# Patient Record
Sex: MALE | Race: WHITE | NOT HISPANIC OR LATINO | Employment: UNEMPLOYED | URBAN - METROPOLITAN AREA
[De-identification: names, ages, dates, MRNs, and addresses within clinical notes are randomized per-mention and may not be internally consistent; named-entity substitution may affect disease eponyms.]

---

## 2019-04-15 ENCOUNTER — HOSPITAL ENCOUNTER (EMERGENCY)
Facility: MEDICAL CENTER | Age: 29
End: 2019-04-15
Attending: EMERGENCY MEDICINE

## 2019-04-15 VITALS
WEIGHT: 154.98 LBS | OXYGEN SATURATION: 95 % | SYSTOLIC BLOOD PRESSURE: 116 MMHG | TEMPERATURE: 97 F | BODY MASS INDEX: 22.19 KG/M2 | HEART RATE: 85 BPM | RESPIRATION RATE: 16 BRPM | DIASTOLIC BLOOD PRESSURE: 83 MMHG | HEIGHT: 70 IN

## 2019-04-15 DIAGNOSIS — M62.838 MUSCLE SPASM: ICD-10-CM

## 2019-04-15 DIAGNOSIS — M54.50 LUMBAR BACK PAIN: ICD-10-CM

## 2019-04-15 PROCEDURE — 99283 EMERGENCY DEPT VISIT LOW MDM: CPT

## 2019-04-15 PROCEDURE — 96372 THER/PROPH/DIAG INJ SC/IM: CPT

## 2019-04-15 PROCEDURE — 700111 HCHG RX REV CODE 636 W/ 250 OVERRIDE (IP): Performed by: EMERGENCY MEDICINE

## 2019-04-15 RX ORDER — IBUPROFEN 600 MG/1
600 TABLET ORAL EVERY 6 HOURS PRN
Qty: 20 TAB | Refills: 0 | Status: SHIPPED | OUTPATIENT
Start: 2019-04-15 | End: 2019-10-27

## 2019-04-15 RX ORDER — DIAZEPAM 5 MG/1
5 TABLET ORAL EVERY 6 HOURS PRN
Qty: 20 TAB | Refills: 0 | Status: SHIPPED | OUTPATIENT
Start: 2019-04-15 | End: 2019-04-20

## 2019-04-15 RX ORDER — KETOROLAC TROMETHAMINE 30 MG/ML
30 INJECTION, SOLUTION INTRAMUSCULAR; INTRAVENOUS ONCE
Status: COMPLETED | OUTPATIENT
Start: 2019-04-15 | End: 2019-04-15

## 2019-04-15 RX ADMIN — KETOROLAC TROMETHAMINE 30 MG: 30 INJECTION, SOLUTION INTRAMUSCULAR at 11:31

## 2019-04-15 ASSESSMENT — LIFESTYLE VARIABLES: DO YOU DRINK ALCOHOL: NO
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
## 2019-04-15 NOTE — ED NOTES
Patient given d/c instructions and prescriptions, verbalized understanding. AAOx4, VSS, d/c'd home.

## 2019-04-17 ENCOUNTER — HOSPITAL ENCOUNTER (EMERGENCY)
Facility: MEDICAL CENTER | Age: 29
End: 2019-04-17
Attending: EMERGENCY MEDICINE

## 2019-04-17 VITALS
TEMPERATURE: 98 F | SYSTOLIC BLOOD PRESSURE: 110 MMHG | WEIGHT: 155.42 LBS | DIASTOLIC BLOOD PRESSURE: 81 MMHG | RESPIRATION RATE: 16 BRPM | BODY MASS INDEX: 21.76 KG/M2 | HEIGHT: 71 IN | OXYGEN SATURATION: 94 % | HEART RATE: 68 BPM

## 2019-04-17 DIAGNOSIS — R07.9 CHEST PAIN, UNSPECIFIED TYPE: ICD-10-CM

## 2019-04-17 DIAGNOSIS — F41.9 ANXIETY: ICD-10-CM

## 2019-04-17 LAB — EKG IMPRESSION: NORMAL

## 2019-04-17 PROCEDURE — 93005 ELECTROCARDIOGRAM TRACING: CPT

## 2019-04-17 PROCEDURE — 700111 HCHG RX REV CODE 636 W/ 250 OVERRIDE (IP): Performed by: EMERGENCY MEDICINE

## 2019-04-17 PROCEDURE — 96372 THER/PROPH/DIAG INJ SC/IM: CPT

## 2019-04-17 PROCEDURE — 93005 ELECTROCARDIOGRAM TRACING: CPT | Performed by: EMERGENCY MEDICINE

## 2019-04-17 PROCEDURE — 99284 EMERGENCY DEPT VISIT MOD MDM: CPT

## 2019-04-17 RX ORDER — LORAZEPAM 2 MG/ML
2 INJECTION INTRAMUSCULAR ONCE
Status: COMPLETED | OUTPATIENT
Start: 2019-04-17 | End: 2019-04-17

## 2019-04-17 RX ADMIN — LORAZEPAM 2 MG: 2 INJECTION INTRAMUSCULAR; INTRAVENOUS at 23:16

## 2019-04-17 ASSESSMENT — PAIN DESCRIPTION - DESCRIPTORS: DESCRIPTORS: ACHING;PRESSURE

## 2019-04-18 NOTE — ED NOTES
Pt leaving AMA due to not wanting blood work or any type of treatment, pt reports that he would just like something for his anxiety. Pt discussed this with the ERP. Pt medicated per MD order, pt signed AMA form. Pt is A&Ox4, ambulates with a steady gait by self out to ed lobby with his wife.
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
## 2019-06-10 ENCOUNTER — HOSPITAL ENCOUNTER (EMERGENCY)
Facility: MEDICAL CENTER | Age: 29
End: 2019-06-10
Attending: EMERGENCY MEDICINE

## 2019-06-10 VITALS
HEART RATE: 94 BPM | SYSTOLIC BLOOD PRESSURE: 131 MMHG | BODY MASS INDEX: 21.87 KG/M2 | RESPIRATION RATE: 16 BRPM | DIASTOLIC BLOOD PRESSURE: 65 MMHG | TEMPERATURE: 98.5 F | OXYGEN SATURATION: 98 % | HEIGHT: 70 IN | WEIGHT: 152.78 LBS

## 2019-06-10 DIAGNOSIS — M54.32 BILATERAL SCIATICA: ICD-10-CM

## 2019-06-10 DIAGNOSIS — S39.012A STRAIN OF LUMBAR REGION, INITIAL ENCOUNTER: ICD-10-CM

## 2019-06-10 DIAGNOSIS — M54.31 BILATERAL SCIATICA: ICD-10-CM

## 2019-06-10 PROCEDURE — A9270 NON-COVERED ITEM OR SERVICE: HCPCS | Performed by: EMERGENCY MEDICINE

## 2019-06-10 PROCEDURE — 700111 HCHG RX REV CODE 636 W/ 250 OVERRIDE (IP): Performed by: EMERGENCY MEDICINE

## 2019-06-10 PROCEDURE — 700102 HCHG RX REV CODE 250 W/ 637 OVERRIDE(OP): Performed by: EMERGENCY MEDICINE

## 2019-06-10 PROCEDURE — 99284 EMERGENCY DEPT VISIT MOD MDM: CPT

## 2019-06-10 PROCEDURE — 96372 THER/PROPH/DIAG INJ SC/IM: CPT

## 2019-06-10 RX ORDER — NAPROXEN 500 MG/1
500 TABLET ORAL 2 TIMES DAILY WITH MEALS
Qty: 60 TAB | Refills: 0 | Status: SHIPPED | OUTPATIENT
Start: 2019-06-10 | End: 2019-10-27

## 2019-06-10 RX ORDER — CYCLOBENZAPRINE HCL 10 MG
10 TABLET ORAL ONCE
Status: COMPLETED | OUTPATIENT
Start: 2019-06-10 | End: 2019-06-10

## 2019-06-10 RX ORDER — KETOROLAC TROMETHAMINE 30 MG/ML
30 INJECTION, SOLUTION INTRAMUSCULAR; INTRAVENOUS ONCE
Status: COMPLETED | OUTPATIENT
Start: 2019-06-10 | End: 2019-06-10

## 2019-06-10 RX ORDER — METHYLPREDNISOLONE 4 MG/1
TABLET ORAL
Qty: 1 KIT | Refills: 0 | Status: SHIPPED | OUTPATIENT
Start: 2019-06-10 | End: 2019-10-27

## 2019-06-10 RX ORDER — CYCLOBENZAPRINE HCL 10 MG
10 TABLET ORAL 3 TIMES DAILY PRN
Qty: 30 TAB | Refills: 0 | Status: SHIPPED | OUTPATIENT
Start: 2019-06-10 | End: 2019-10-27

## 2019-06-10 RX ADMIN — CYCLOBENZAPRINE HYDROCHLORIDE 10 MG: 10 TABLET, FILM COATED ORAL at 20:40

## 2019-06-10 RX ADMIN — KETOROLAC TROMETHAMINE 30 MG: 30 INJECTION, SOLUTION INTRAMUSCULAR at 20:40

## 2019-06-11 NOTE — ED TRIAGE NOTES
Chief Complaint   Patient presents with   • Low Back Pain     radiating to BLE     Patient ambulatory to triage with friend; reports involved in treatment program, adamant about not receiveing narcotics for pain relief.      Explained wait time and triage process to pt. Pt placed back out in lobby, told to notify ED tech or triage RN of any changes, verbalized understanding.
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
## 2019-10-07 ENCOUNTER — HOSPITAL ENCOUNTER (EMERGENCY)
Facility: MEDICAL CENTER | Age: 29
End: 2019-10-07
Attending: EMERGENCY MEDICINE
Payer: MEDICAID

## 2019-10-07 VITALS
OXYGEN SATURATION: 98 % | SYSTOLIC BLOOD PRESSURE: 100 MMHG | DIASTOLIC BLOOD PRESSURE: 66 MMHG | BODY MASS INDEX: 20.77 KG/M2 | TEMPERATURE: 99.3 F | WEIGHT: 145.06 LBS | HEART RATE: 60 BPM | RESPIRATION RATE: 14 BRPM | HEIGHT: 70 IN

## 2019-10-07 DIAGNOSIS — M54.42 CHRONIC BILATERAL LOW BACK PAIN WITH LEFT-SIDED SCIATICA: ICD-10-CM

## 2019-10-07 DIAGNOSIS — G89.29 CHRONIC BILATERAL LOW BACK PAIN WITH LEFT-SIDED SCIATICA: ICD-10-CM

## 2019-10-07 PROCEDURE — 700102 HCHG RX REV CODE 250 W/ 637 OVERRIDE(OP): Performed by: EMERGENCY MEDICINE

## 2019-10-07 PROCEDURE — 700111 HCHG RX REV CODE 636 W/ 250 OVERRIDE (IP): Performed by: EMERGENCY MEDICINE

## 2019-10-07 PROCEDURE — A9270 NON-COVERED ITEM OR SERVICE: HCPCS | Performed by: EMERGENCY MEDICINE

## 2019-10-07 PROCEDURE — 99284 EMERGENCY DEPT VISIT MOD MDM: CPT

## 2019-10-07 PROCEDURE — 96372 THER/PROPH/DIAG INJ SC/IM: CPT

## 2019-10-07 RX ORDER — KETOROLAC TROMETHAMINE 30 MG/ML
60 INJECTION, SOLUTION INTRAMUSCULAR; INTRAVENOUS ONCE
Status: COMPLETED | OUTPATIENT
Start: 2019-10-07 | End: 2019-10-07

## 2019-10-07 RX ORDER — DIAZEPAM 5 MG/1
5 TABLET ORAL ONCE
Status: COMPLETED | OUTPATIENT
Start: 2019-10-07 | End: 2019-10-07

## 2019-10-07 RX ADMIN — DIAZEPAM 5 MG: 5 TABLET ORAL at 18:41

## 2019-10-07 RX ADMIN — KETOROLAC TROMETHAMINE 60 MG: 30 INJECTION, SOLUTION INTRAMUSCULAR at 18:41
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
## 2019-10-08 NOTE — DISCHARGE INSTRUCTIONS
Please use the contact information we have provided to schedule a visit with Dr. herrera's regarding your ongoing back issues.  Use the other clinic information to set up a primary care visit.
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
## 2019-10-27 ENCOUNTER — HOSPITAL ENCOUNTER (EMERGENCY)
Facility: MEDICAL CENTER | Age: 29
End: 2019-10-27
Attending: EMERGENCY MEDICINE
Payer: MEDICAID

## 2019-10-27 VITALS
SYSTOLIC BLOOD PRESSURE: 121 MMHG | RESPIRATION RATE: 18 BRPM | DIASTOLIC BLOOD PRESSURE: 69 MMHG | HEIGHT: 70 IN | BODY MASS INDEX: 21.15 KG/M2 | WEIGHT: 147.71 LBS | TEMPERATURE: 97.8 F | OXYGEN SATURATION: 95 % | HEART RATE: 72 BPM

## 2019-10-27 DIAGNOSIS — K02.9 DENTAL CARIES: ICD-10-CM

## 2019-10-27 DIAGNOSIS — F41.9 ANXIETY: ICD-10-CM

## 2019-10-27 DIAGNOSIS — H92.02 LEFT EAR PAIN: ICD-10-CM

## 2019-10-27 PROCEDURE — 96372 THER/PROPH/DIAG INJ SC/IM: CPT

## 2019-10-27 PROCEDURE — 99283 EMERGENCY DEPT VISIT LOW MDM: CPT

## 2019-10-27 PROCEDURE — 700111 HCHG RX REV CODE 636 W/ 250 OVERRIDE (IP): Performed by: EMERGENCY MEDICINE

## 2019-10-27 RX ORDER — AMOXICILLIN 875 MG/1
875 TABLET, COATED ORAL 2 TIMES DAILY
COMMUNITY
Start: 2019-10-14

## 2019-10-27 RX ORDER — AMOXICILLIN 500 MG/1
500 CAPSULE ORAL 3 TIMES DAILY
Qty: 21 CAP | Refills: 0 | Status: SHIPPED | OUTPATIENT
Start: 2019-10-27 | End: 2019-11-03

## 2019-10-27 RX ORDER — IBUPROFEN 200 MG
600 TABLET ORAL EVERY 6 HOURS PRN
COMMUNITY

## 2019-10-27 RX ORDER — LORAZEPAM 2 MG/ML
2 INJECTION INTRAMUSCULAR ONCE
Status: COMPLETED | OUTPATIENT
Start: 2019-10-27 | End: 2019-10-27

## 2019-10-27 RX ADMIN — LORAZEPAM 2 MG: 2 INJECTION INTRAMUSCULAR; INTRAVENOUS at 15:56

## 2019-10-27 NOTE — ED PROVIDER NOTES
ED Provider Note    CHIEF COMPLAINT  Chief Complaint   Patient presents with   • Anxiety   • Earache       HPI  Medardo Vergara is a 29 y.o. male with a history of anxiety, PTSD who presents with complaints of left ear pain for the past 2 weeks along with increased anxiety and stress.  The patient appears to be somewhat tremulous, and says that he has been out of his Klonopin for his anxiety for at least 2 weeks.  The patient says he has appointment to see his primary care provider tomorrow.  He notes that he has been having some pain to the area just behind his left ear for the past 2 weeks.  He has not noticed any ear drainage or hearing loss.  He denies any fever, chills, runny nose, sore throat, cough, congestion, nausea, or vomiting.  He does note he may have a bad tooth in his left lower jaw where he has a cracked second molar.  Has not noticed any swelling to the gums or cheek area.    REVIEW OF SYSTEMS  See HPI for further details. All other systems are negative.     PAST MEDICAL HISTORY  Past Medical History:   Diagnosis Date   • PTSD (post-traumatic stress disorder)        FAMILY HISTORY  History reviewed. No pertinent family history.    SOCIAL HISTORY  Social History     Socioeconomic History   • Marital status:      Spouse name: Not on file   • Number of children: Not on file   • Years of education: Not on file   • Highest education level: Not on file   Occupational History   • Not on file   Social Needs   • Financial resource strain: Not on file   • Food insecurity:     Worry: Not on file     Inability: Not on file   • Transportation needs:     Medical: Not on file     Non-medical: Not on file   Tobacco Use   • Smoking status: Current Every Day Smoker     Packs/day: 0.50     Years: 5.00     Pack years: 2.50     Types: Cigarettes   • Smokeless tobacco: Never Used   Substance and Sexual Activity   • Alcohol use: No     Comment: rarely   • Drug use: No   • Sexual activity: Not on file   Lifestyle   •  "Physical activity:     Days per week: Not on file     Minutes per session: Not on file   • Stress: Not on file   Relationships   • Social connections:     Talks on phone: Not on file     Gets together: Not on file     Attends Mandaen service: Not on file     Active member of club or organization: Not on file     Attends meetings of clubs or organizations: Not on file     Relationship status: Not on file   • Intimate partner violence:     Fear of current or ex partner: Not on file     Emotionally abused: Not on file     Physically abused: Not on file     Forced sexual activity: Not on file   Other Topics Concern   • Not on file   Social History Narrative   • Not on file       SURGICAL HISTORY  History reviewed. No pertinent surgical history.    CURRENT MEDICATIONS  Home Medications    **Home medications have not yet been reviewed for this encounter**         ALLERGIES  Allergies   Allergen Reactions   • Ciprofloxacin      Other reaction(s): Dyspnea       PHYSICAL EXAM  VITAL SIGNS: Blood Pressure 121/69   Pulse 72   Temperature 36.6 °C (97.8 °F) (Temporal)   Respiration 18   Height 1.778 m (5' 10\")   Weight 67 kg (147 lb 11.3 oz)   Oxygen Saturation 95%   Body Mass Index 21.19 kg/m²   Constitutional: Awake, alert, very anxious appearing, nontoxic.  HENT: Atraumatic. Bilateral external ears normal, mucous membranes moist, throat nonerythematous without exudates, nose is normal.  Appears to be several carious appearing teeth.  To the left lower jaw the second molar appears to be cracked.  There is no swelling or tenderness along the buccal mucosa or gumline.  There is no trismus, patient is able to open close mouth without difficulty.  There is no swelling to the soft palate or uvula.  Eyes: PERRL, EOMI, conjunctiva moist, noninjected.  Neck: Nontender, Normal range of motion, No nuchal rigidity, No stridor.   Lymphatic: No lymphadenopathy noted.   Cardiovascular: Regular rate and rhythm, no murmurs, rubs, " gallops.  Thorax & Lungs:  Good breath sounds bilaterally, no wheezes, rales, or retractions.  No chest tenderness.  Abdomen: Bowel sounds normal, Soft, nontender, nondistended, no rebound, guarding, masses.  Back: No CVA or spinal tenderness.  Extremities: Intact distal pulses, No edema, No tenderness.   Skin: Warm, Dry, No rashes.   Musculoskeletal: No joint swelling or tenderness.  Neurologic: Alert & oriented x 3, sensory and motor function normal. No focal deficits.   Psychiatric: Very anxious appearing, speech is slightly pressured, repetitive counseling of the lower extremities.  Patient denies any suicidal homicidal ideation.        COURSE & MEDICAL DECISION MAKING  Pertinent Labs & Imaging studies reviewed. (See chart for details)  The patient presents with the above complaints.  He has been seen several times for anxiety, and did receive a prescription for Valium approximately 6 months ago.  Given that this is a chronic ongoing problem, patient was told he would not need to get any further prescriptions from his primary care physician.  He was offered a dose of Valium, but says that he appears much more stressed and anxious than usual.  He was given Ativan 2 mg IM.  The patient complains of left ear pain, but the tympanic membrane looks fine.  His tenderness appears to be in the inferior periauricular area when I do not really detect any skin swelling, erythema, induration.  He he may have some radiation of dental pain as he does have a carious appearing second molar.  The patient will placed on a one-week course of amoxicillin for his possible dental caries.  He is to follow-up with a dentist as soon as possible.  He is to return to the ER for any worsening symptoms, fever, increased pain, redness, or swelling, increased anxiety or depression, or any other problems.    FINAL IMPRESSION  1.  Dental caries  2.  Left otalgia  3.  Anxiety disorder         Electronically signed by: Floyd Burton, 10/27/2019  3:50 PM

## 2019-10-27 NOTE — ED TRIAGE NOTES
"Presents with a hx of PTSD with recurring episodes of anxiety.  He also C/O left earache persisting for weeks.  Chief Complaint   Patient presents with   • Anxiety   • Earache     /69   Pulse 72   Temp 36.6 °C (97.8 °F) (Temporal)   Resp 18   Ht 1.778 m (5' 10\")   Wt 67 kg (147 lb 11.3 oz)   SpO2 95%   BMI 21.19 kg/m²     "

## 2019-10-27 NOTE — ED NOTES
Discharge instructions provided. Rx x1 given and pt educated on how and when to take medication, pt educated on s/s of increased infection, and educated to finish course of antibiotics.  Pt verbalized the understanding of discharge instructions to follow up with PCP and to return to ER if condition worsens.  Pt ambulated out of ER without difficulty. Pt called an uber home.

## 2019-10-27 NOTE — ED NOTES
L ear pain for the last 2 weeks, pt was prescribed amoxicillin 2 weeks ago for a lower L  dental pain in [place] (admitted for opioid withdraws), pt did not finish course, pt came in c/o L ear pain with difficulty hearing.

## 2019-12-02 ENCOUNTER — APPOINTMENT (OUTPATIENT)
Dept: RADIOLOGY | Facility: MEDICAL CENTER | Age: 29
End: 2019-12-02
Attending: EMERGENCY MEDICINE
Payer: MEDICAID

## 2019-12-02 ENCOUNTER — HOSPITAL ENCOUNTER (EMERGENCY)
Facility: MEDICAL CENTER | Age: 29
End: 2019-12-02
Attending: EMERGENCY MEDICINE
Payer: MEDICAID

## 2019-12-02 VITALS
OXYGEN SATURATION: 95 % | RESPIRATION RATE: 16 BRPM | HEIGHT: 70 IN | BODY MASS INDEX: 20.96 KG/M2 | WEIGHT: 146.39 LBS | HEART RATE: 93 BPM | DIASTOLIC BLOOD PRESSURE: 72 MMHG | TEMPERATURE: 99.9 F | SYSTOLIC BLOOD PRESSURE: 100 MMHG

## 2019-12-02 DIAGNOSIS — J10.1 INFLUENZA B: ICD-10-CM

## 2019-12-02 LAB
FLUAV RNA SPEC QL NAA+PROBE: NEGATIVE
FLUBV RNA SPEC QL NAA+PROBE: POSITIVE

## 2019-12-02 PROCEDURE — 700111 HCHG RX REV CODE 636 W/ 250 OVERRIDE (IP): Mod: JW | Performed by: EMERGENCY MEDICINE

## 2019-12-02 PROCEDURE — 96372 THER/PROPH/DIAG INJ SC/IM: CPT

## 2019-12-02 PROCEDURE — 71046 X-RAY EXAM CHEST 2 VIEWS: CPT

## 2019-12-02 PROCEDURE — 99284 EMERGENCY DEPT VISIT MOD MDM: CPT

## 2019-12-02 PROCEDURE — 87502 INFLUENZA DNA AMP PROBE: CPT

## 2019-12-02 RX ORDER — KETOROLAC TROMETHAMINE 30 MG/ML
15 INJECTION, SOLUTION INTRAMUSCULAR; INTRAVENOUS ONCE
Status: COMPLETED | OUTPATIENT
Start: 2019-12-02 | End: 2019-12-02

## 2019-12-02 RX ORDER — OSELTAMIVIR PHOSPHATE 75 MG/1
75 CAPSULE ORAL 2 TIMES DAILY
Qty: 10 CAP | Refills: 0 | Status: SHIPPED | OUTPATIENT
Start: 2019-12-02 | End: 2019-12-07

## 2019-12-02 RX ORDER — SODIUM CHLORIDE 9 MG/ML
1000 INJECTION, SOLUTION INTRAVENOUS ONCE
Status: DISCONTINUED | OUTPATIENT
Start: 2019-12-02 | End: 2019-12-02

## 2019-12-02 RX ADMIN — KETOROLAC TROMETHAMINE 15 MG: 30 INJECTION, SOLUTION INTRAMUSCULAR at 03:12

## 2019-12-02 ASSESSMENT — LIFESTYLE VARIABLES: DO YOU DRINK ALCOHOL: NO

## 2019-12-02 NOTE — ED TRIAGE NOTES
"Chief Complaint   Patient presents with   • Flu Like Symptoms   • Generalized Body Aches     Cough, congestion, body aches x 1-2 days     /60   Pulse 96   Temp 36.2 °C (97.2 °F) (Temporal)   Resp 18   Ht 1.778 m (5' 10\")   Wt 66.4 kg (146 lb 6.2 oz)   SpO2 95%   BMI 21.00 kg/m²     30 y/o male presents to ED with complaint of cough, congestion, headaches, and generalized malaise over past 1-2 days.  He describes subjective F/C but has not measured a fever.  Has not taken any OTC medication for these symptoms.     Educated on triage process. Placed back in lobby. Advised to notify triage RN with any changes. Apologized for wait times.     "
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
## 2022-04-18 ENCOUNTER — HOSPITAL ENCOUNTER (EMERGENCY)
Facility: MEDICAL CENTER | Age: 32
End: 2022-04-18
Attending: EMERGENCY MEDICINE
Payer: COMMERCIAL

## 2022-04-18 VITALS
BODY MASS INDEX: 19.44 KG/M2 | SYSTOLIC BLOOD PRESSURE: 114 MMHG | TEMPERATURE: 98.4 F | HEIGHT: 70 IN | HEART RATE: 97 BPM | OXYGEN SATURATION: 97 % | WEIGHT: 135.8 LBS | RESPIRATION RATE: 20 BRPM | DIASTOLIC BLOOD PRESSURE: 88 MMHG

## 2022-04-18 DIAGNOSIS — F13.20 BENZODIAZEPINE DEPENDENCE (HCC): ICD-10-CM

## 2022-04-18 DIAGNOSIS — F10.230 ALCOHOL DEPENDENCE WITH UNCOMPLICATED WITHDRAWAL (HCC): ICD-10-CM

## 2022-04-18 LAB — POC BREATHALIZER: 0 PERCENT (ref 0–0.01)

## 2022-04-18 PROCEDURE — A9270 NON-COVERED ITEM OR SERVICE: HCPCS | Performed by: EMERGENCY MEDICINE

## 2022-04-18 PROCEDURE — 99284 EMERGENCY DEPT VISIT MOD MDM: CPT

## 2022-04-18 PROCEDURE — 302970 POC BREATHALIZER: Performed by: EMERGENCY MEDICINE

## 2022-04-18 PROCEDURE — 700102 HCHG RX REV CODE 250 W/ 637 OVERRIDE(OP): Performed by: EMERGENCY MEDICINE

## 2022-04-18 PROCEDURE — 96372 THER/PROPH/DIAG INJ SC/IM: CPT

## 2022-04-18 PROCEDURE — 700111 HCHG RX REV CODE 636 W/ 250 OVERRIDE (IP): Performed by: EMERGENCY MEDICINE

## 2022-04-18 RX ORDER — ONDANSETRON 4 MG/1
4 TABLET, ORALLY DISINTEGRATING ORAL ONCE
Status: COMPLETED | OUTPATIENT
Start: 2022-04-18 | End: 2022-04-18

## 2022-04-18 RX ORDER — CLONAZEPAM 0.5 MG/1
0.5 TABLET ORAL ONCE
Status: COMPLETED | OUTPATIENT
Start: 2022-04-18 | End: 2022-04-18

## 2022-04-18 RX ORDER — PHENOBARBITAL SODIUM 130 MG/ML
200 INJECTION, SOLUTION INTRAMUSCULAR; INTRAVENOUS ONCE
Status: COMPLETED | OUTPATIENT
Start: 2022-04-18 | End: 2022-04-18

## 2022-04-18 RX ADMIN — ONDANSETRON 4 MG: 4 TABLET, ORALLY DISINTEGRATING ORAL at 20:22

## 2022-04-18 RX ADMIN — PHENOBARBITAL SODIUM 200 MG: 130 INJECTION INTRAMUSCULAR; INTRAVENOUS at 20:23

## 2022-04-18 RX ADMIN — CLONAZEPAM 0.5 MG: 0.5 TABLET ORAL at 21:11

## 2022-04-18 ASSESSMENT — LIFESTYLE VARIABLES
CONSUMPTION TOTAL: POSITIVE
HOW MANY TIMES IN THE PAST YEAR HAVE YOU HAD 5 OR MORE DRINKS IN A DAY: 300
TOTAL SCORE: 4
HAVE PEOPLE ANNOYED YOU BY CRITICIZING YOUR DRINKING: YES
EVER HAD A DRINK FIRST THING IN THE MORNING TO STEADY YOUR NERVES TO GET RID OF A HANGOVER: YES
TOTAL SCORE: 4
AVERAGE NUMBER OF DAYS PER WEEK YOU HAVE A DRINK CONTAINING ALCOHOL: 6
DO YOU DRINK ALCOHOL: YES
ON A TYPICAL DAY WHEN YOU DRINK ALCOHOL HOW MANY DRINKS DO YOU HAVE: 25
HAVE YOU EVER FELT YOU SHOULD CUT DOWN ON YOUR DRINKING: YES
DOES PATIENT WANT TO TALK TO SOMEONE ABOUT QUITTING: YES
EVER FELT BAD OR GUILTY ABOUT YOUR DRINKING: YES
DOES PATIENT WANT TO STOP DRINKING: YES
TOTAL SCORE: 4

## 2022-04-19 NOTE — ED NOTES
"Pt discharged home with ride. pt in possession of belongings. Pt provided discharge education and information pertaining to medications and follow up appointments. Pt received copy of discharge instructions and verbalized understanding. /88   Pulse 97   Temp 36.9 °C (98.4 °F) (Tympanic)   Resp 20   Ht 1.778 m (5' 10\")   Wt 61.6 kg (135 lb 12.9 oz)   SpO2 97%   BMI 19.49 kg/m²   "

## 2022-04-19 NOTE — DISCHARGE PLANNING
Alert Team:    Discussed additional alcohol detox options at [facility] since [facility] had no bed availability until tomorrow. Patient declined this option stating he has a friend that he can stay with tonight to f/u at [facility] tomorrow. No other needs vocalized at this time. Informed Dr. Salcido.

## 2022-04-19 NOTE — ED TRIAGE NOTES
Patient drinks about 1 pint a day of hard alcohol and has been taking clonazepam but stopped two days ago.  In the meantime he is struggling with anxiety.  Denies SI.  States he has a support system.  Tried [facility] but no bed is available.

## 2022-04-19 NOTE — ED PROVIDER NOTES
ED Provider Note    CHIEF COMPLAINT  Chief Complaint   Patient presents with   • Detox       HPI  Medardo Vergara is a 32 y.o. male who presents to the emergency department for withdrawal symptoms.  The patient states he quit taking fentanyl 18 days ago and has been on Suboxone ever since then and doing well with it so he decided he wanted to stop all of his other drugs including alcohol and clonazepam.  He states he last drank alcohol about 40 hours ago and clonazepam was about half a day or day before that.  He states he is feeling nauseated he is feeling shaky extremely anxious he has been unable to sleep he denies any hallucinations and he has not had seizures.  Lately he has been drinking a pint or more of alcohol a day he has never had seizure withdrawals before and this is the worst he is ever felt with withdrawal symptoms.  He went to [facility] today but states that they were full and due to his insurance he cannot get the first bed that is available but he is here for further help.    REVIEW OF SYSTEMS  Positives as above. Pertinent negatives include fever chills cough congestion chest pain abdominal pain flank pain vomiting hallucinations  All other review of systems are negative    PAST MEDICAL HISTORY   has a past medical history of PTSD (post-traumatic stress disorder).    SOCIAL HISTORY  Social History     Tobacco Use   • Smoking status: Current Every Day Smoker     Packs/day: 0.50     Years: 5.00     Pack years: 2.50     Types: Cigarettes   • Smokeless tobacco: Never Used   Substance and Sexual Activity   • Alcohol use: No     Comment: rarely   • Drug use: No   • Sexual activity: Not on file       SURGICAL HISTORY  patient denies any surgical history    CURRENT MEDICATIONS  Home Medications     Reviewed by Deonna Randhawa R.N. (Registered Nurse) on 04/18/22 at 1832  Med List Status: <None>   Medication Last Dose Status   amoxicillin (AMOXIL) 875 MG tablet  Active   ibuprofen (MOTRIN) 200  "MG Tab  Active                ALLERGIES  Allergies   Allergen Reactions   • Ciprofloxacin      Other reaction(s): Dyspnea       PHYSICAL EXAM  VITAL SIGNS: /94   Pulse 100   Temp 37.3 °C (99.1 °F) (Temporal)   Resp 20   Ht 1.778 m (5' 10\")   Wt 61.6 kg (135 lb 12.9 oz)   SpO2 94%   BMI 19.49 kg/m²    Pulse ox interpretation: I interpret this pulse ox as normal.  Constitutional: Alert in moderate distress  HENT: Normocephalic atraumatic, MMM mild tongue fasciculation  Eyes: PER, Conjunctiva normal, Non-icteric.   Cardiovascular: Regular rate and rhythm, no murmurs.   Thorax & Lungs: Normal breath sounds, No respiratory distress, No wheezing, No chest tenderness.   Abdomen: Bowel sounds normal, Soft, No tenderness, No pulsatile masses. No peritoneal signs.  Skin: Warm, Dry, No erythema, No rash.   Extremities/MSK: Fine tremor bilateral hands intact equal distal pulses, No edema, No tenderness, No cyanosis, no major deformities noted  Neurologic: Alert and oriented x3, No focal deficits noted.   Psychiatric: Agitated affect constantly pacing but speaking slowly and cooperative      DIFFERENTIAL DIAGNOSIS AND WORK UP PLAN    This is a 32 y.o. male who presents with benzo and alcohol withdrawal last use about 2 days ago, I request we do a breathalyzer just to ensure that he really has no alcohol on board and then he will be treated with some IM phenobarbital and Zofran and I will discuss case for behavioral health and hopefully we can get him to a facility today.  Otherwise his abdomen is soft nontender and beyond some fasciculations he is not responding to internal stimuli  He states his uncle brought him in today and can take him home until he gets into [facility]    DIAGNOSTIC STUDIES / PROCEDURES  LABS  Pertinent Lab Findings    Labs Reviewed   POC BREATHALIZER - Normal       COURSE & MEDICAL DECISION MAKING  Pertinent Labs & Imaging studies reviewed. (See chart for details)    8:51 PM  Behavioral health NP " review of discussed with the patient he just wishes to go home and follow-up with our  tomorrow.    I verified that the patient was wearing a mask and I was wearing appropriate PPE every time I entered the room. The patient's mask was on the patient at all times during my encounter except for a brief view of the oropharynx.    The patient will return for new or worsening symptoms and is stable at the time of discharge.    The patient is referred to a primary physician for blood pressure management, diabetic screening, and for all other preventative health concerns.    DISPOSITION:  Patient will be discharged home in stable condition.    FOLLOW UP:  [facility], Emergency Dept  [address] [facility] [address] [id number] [phone]    If symptoms worsen    [facility] [address] [place] [phone]          OUTPATIENT MEDICATIONS:  Discharge Medication List as of 4/18/2022  9:12 PM              FINAL IMPRESSION  1. Alcohol dependence with uncomplicated withdrawal (HCC)     2. Benzodiazepine dependence (HCC)             Electronically signed by: Julia Salcido M.D., 4/18/2022 8:03 PM    This dictation has been created using voice recognition software and/or scribes. The accuracy of the dictation is limited by the abilities of the software and the expertise of the scribes. I expect there may be some errors of grammar and possibly content. I made every attempt to manually correct the errors within my dictation. However, errors related to voice recognition software and/or scribes may still exist and should be interpreted within the appropriate context.

## 2022-04-19 NOTE — ED NOTES
"Ambulated to room with steady gait, pt appears anxious and reports hes not taking his anxiety medication.   Last ETOH drink was \"at least 4 days\"  "

## 2022-04-19 NOTE — ED NOTES
Pt reports he has bed at [facility] tomorrow at ten. Instructed pt to come back if he needed too.
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
## 2022-04-26 ENCOUNTER — APPOINTMENT (OUTPATIENT)
Dept: RADIOLOGY | Facility: MEDICAL CENTER | Age: 32
End: 2022-04-26
Attending: EMERGENCY MEDICINE
Payer: COMMERCIAL

## 2022-04-26 ENCOUNTER — HOSPITAL ENCOUNTER (EMERGENCY)
Facility: MEDICAL CENTER | Age: 32
End: 2022-04-26
Attending: EMERGENCY MEDICINE
Payer: COMMERCIAL

## 2022-04-26 VITALS
HEART RATE: 107 BPM | SYSTOLIC BLOOD PRESSURE: 110 MMHG | TEMPERATURE: 97.3 F | WEIGHT: 148 LBS | DIASTOLIC BLOOD PRESSURE: 71 MMHG | RESPIRATION RATE: 20 BRPM | OXYGEN SATURATION: 96 % | BODY MASS INDEX: 21.19 KG/M2 | HEIGHT: 70 IN

## 2022-04-26 DIAGNOSIS — R07.9 CHEST PAIN, UNSPECIFIED TYPE: ICD-10-CM

## 2022-04-26 DIAGNOSIS — F41.0 PANIC ATTACK: ICD-10-CM

## 2022-04-26 LAB
ALBUMIN SERPL BCP-MCNC: 4.9 G/DL (ref 3.2–4.9)
ALBUMIN/GLOB SERPL: 1.8 G/DL
ALP SERPL-CCNC: 67 U/L (ref 30–99)
ALT SERPL-CCNC: 24 U/L (ref 2–50)
AMPHET UR QL SCN: NEGATIVE
ANION GAP SERPL CALC-SCNC: 12 MMOL/L (ref 7–16)
AST SERPL-CCNC: 21 U/L (ref 12–45)
BARBITURATES UR QL SCN: POSITIVE
BASOPHILS # BLD AUTO: 0.9 % (ref 0–1.8)
BASOPHILS # BLD: 0.07 K/UL (ref 0–0.12)
BENZODIAZ UR QL SCN: NEGATIVE
BILIRUB SERPL-MCNC: 0.2 MG/DL (ref 0.1–1.5)
BUN SERPL-MCNC: 19 MG/DL (ref 8–22)
BZE UR QL SCN: NEGATIVE
CALCIUM SERPL-MCNC: 9.6 MG/DL (ref 8.5–10.5)
CANNABINOIDS UR QL SCN: NEGATIVE
CHLORIDE SERPL-SCNC: 103 MMOL/L (ref 96–112)
CO2 SERPL-SCNC: 25 MMOL/L (ref 20–33)
CREAT SERPL-MCNC: 0.83 MG/DL (ref 0.5–1.4)
EKG IMPRESSION: NORMAL
EOSINOPHIL # BLD AUTO: 0.05 K/UL (ref 0–0.51)
EOSINOPHIL NFR BLD: 0.7 % (ref 0–6.9)
ERYTHROCYTE [DISTWIDTH] IN BLOOD BY AUTOMATED COUNT: 39.2 FL (ref 35.9–50)
ETHANOL BLD-MCNC: <10.1 MG/DL (ref 0–10)
GFR SERPLBLD CREATININE-BSD FMLA CKD-EPI: 118 ML/MIN/1.73 M 2
GLOBULIN SER CALC-MCNC: 2.7 G/DL (ref 1.9–3.5)
GLUCOSE SERPL-MCNC: 100 MG/DL (ref 65–99)
HCT VFR BLD AUTO: 43.7 % (ref 42–52)
HGB BLD-MCNC: 15 G/DL (ref 14–18)
IMM GRANULOCYTES # BLD AUTO: 0.07 K/UL (ref 0–0.11)
IMM GRANULOCYTES NFR BLD AUTO: 0.9 % (ref 0–0.9)
LYMPHOCYTES # BLD AUTO: 2.16 K/UL (ref 1–4.8)
LYMPHOCYTES NFR BLD: 28.5 % (ref 22–41)
MCH RBC QN AUTO: 30.5 PG (ref 27–33)
MCHC RBC AUTO-ENTMCNC: 34.3 G/DL (ref 33.7–35.3)
MCV RBC AUTO: 88.8 FL (ref 81.4–97.8)
METHADONE UR QL SCN: NEGATIVE
MONOCYTES # BLD AUTO: 0.61 K/UL (ref 0–0.85)
MONOCYTES NFR BLD AUTO: 8 % (ref 0–13.4)
NEUTROPHILS # BLD AUTO: 4.62 K/UL (ref 1.82–7.42)
NEUTROPHILS NFR BLD: 61 % (ref 44–72)
NRBC # BLD AUTO: 0 K/UL
NRBC BLD-RTO: 0 /100 WBC
OPIATES UR QL SCN: NEGATIVE
OXYCODONE UR QL SCN: NEGATIVE
PCP UR QL SCN: NEGATIVE
PLATELET # BLD AUTO: 247 K/UL (ref 164–446)
PMV BLD AUTO: 9.8 FL (ref 9–12.9)
POTASSIUM SERPL-SCNC: 3.8 MMOL/L (ref 3.6–5.5)
PROPOXYPH UR QL SCN: NEGATIVE
PROT SERPL-MCNC: 7.6 G/DL (ref 6–8.2)
RBC # BLD AUTO: 4.92 M/UL (ref 4.7–6.1)
SODIUM SERPL-SCNC: 140 MMOL/L (ref 135–145)
TROPONIN T SERPL-MCNC: <6 NG/L (ref 6–19)
WBC # BLD AUTO: 7.6 K/UL (ref 4.8–10.8)

## 2022-04-26 PROCEDURE — 99285 EMERGENCY DEPT VISIT HI MDM: CPT

## 2022-04-26 PROCEDURE — 80053 COMPREHEN METABOLIC PANEL: CPT

## 2022-04-26 PROCEDURE — 85025 COMPLETE CBC W/AUTO DIFF WBC: CPT

## 2022-04-26 PROCEDURE — 96375 TX/PRO/DX INJ NEW DRUG ADDON: CPT

## 2022-04-26 PROCEDURE — 700111 HCHG RX REV CODE 636 W/ 250 OVERRIDE (IP): Performed by: EMERGENCY MEDICINE

## 2022-04-26 PROCEDURE — 93005 ELECTROCARDIOGRAM TRACING: CPT

## 2022-04-26 PROCEDURE — A9270 NON-COVERED ITEM OR SERVICE: HCPCS | Performed by: EMERGENCY MEDICINE

## 2022-04-26 PROCEDURE — 82077 ASSAY SPEC XCP UR&BREATH IA: CPT

## 2022-04-26 PROCEDURE — 700102 HCHG RX REV CODE 250 W/ 637 OVERRIDE(OP): Performed by: EMERGENCY MEDICINE

## 2022-04-26 PROCEDURE — 93005 ELECTROCARDIOGRAM TRACING: CPT | Performed by: EMERGENCY MEDICINE

## 2022-04-26 PROCEDURE — 36415 COLL VENOUS BLD VENIPUNCTURE: CPT

## 2022-04-26 PROCEDURE — 84484 ASSAY OF TROPONIN QUANT: CPT

## 2022-04-26 PROCEDURE — 96374 THER/PROPH/DIAG INJ IV PUSH: CPT

## 2022-04-26 PROCEDURE — 71045 X-RAY EXAM CHEST 1 VIEW: CPT

## 2022-04-26 PROCEDURE — 80307 DRUG TEST PRSMV CHEM ANLYZR: CPT

## 2022-04-26 RX ORDER — LORAZEPAM 1 MG/1
1 TABLET ORAL 3 TIMES DAILY
Qty: 9 TABLET | Refills: 0 | Status: SHIPPED | OUTPATIENT
Start: 2022-04-26 | End: 2022-04-29

## 2022-04-26 RX ORDER — KETOROLAC TROMETHAMINE 30 MG/ML
30 INJECTION, SOLUTION INTRAMUSCULAR; INTRAVENOUS ONCE
Status: COMPLETED | OUTPATIENT
Start: 2022-04-26 | End: 2022-04-26

## 2022-04-26 RX ORDER — LORAZEPAM 2 MG/ML
1 INJECTION INTRAMUSCULAR ONCE
Status: COMPLETED | OUTPATIENT
Start: 2022-04-26 | End: 2022-04-26

## 2022-04-26 RX ORDER — ACETAMINOPHEN 500 MG
1000 TABLET ORAL ONCE
Status: COMPLETED | OUTPATIENT
Start: 2022-04-26 | End: 2022-04-26

## 2022-04-26 RX ORDER — LORAZEPAM 1 MG/1
1 TABLET ORAL 3 TIMES DAILY
Qty: 9 TABLET | Refills: 0 | Status: SHIPPED | OUTPATIENT
Start: 2022-04-26 | End: 2022-04-26 | Stop reason: SDUPTHER

## 2022-04-26 RX ADMIN — KETOROLAC TROMETHAMINE 30 MG: 30 INJECTION, SOLUTION INTRAMUSCULAR at 16:08

## 2022-04-26 RX ADMIN — LORAZEPAM 1 MG: 2 INJECTION INTRAMUSCULAR; INTRAVENOUS at 16:08

## 2022-04-26 RX ADMIN — ACETAMINOPHEN 1000 MG: 500 TABLET ORAL at 18:20

## 2022-04-26 NOTE — ED PROVIDER NOTES
"ED Provider Note    Scribed for Chinmay Zamarripa M.D. by Francesca Escobedo. 4/26/2022  3:35 PM    Primary care provider: Pcp Pt States None  Means of arrival: Walk-in  History obtained from: Patient  History limited by: None noted    CHIEF COMPLAINT  Chief Complaint   Patient presents with    Seizure     \"I keep seizing up\".  Pt reports he's been on klonopin for 1 year and states he doesn't have enough through the end of the month so he's been cutting back his dose.  Pt reports he is from [facility].       HPI  Medardo Vergara is a 32 y.o. male who presents to the Emergency Department for seizure-like activity onset today. He states, \"I couldn't see anything I could only focus on what was in front of me.\" He endorses associated nausea, weakness and epigastric pain onset today. He describes his pain as sharp. He denies any fever or vomiting. The patient is compliant with his Klonopin, but states he does not have enough for the rest of the month. The patient was recently at [facility] and was released yesterday. Patient reports a history of alcohol abuse.     REVIEW OF SYSTEMS  Pertinent positives include seizure-like activity, nausea, weakness, and epigastric pain. Pertinent negatives include no fever or vomiting.  All other systems reviewed and negative.    PAST MEDICAL HISTORY   has a past medical history of PTSD (post-traumatic stress disorder).    SURGICAL HISTORY  patient denies any surgical history    SOCIAL HISTORY  Social History     Tobacco Use    Smoking status: Current Every Day Smoker     Packs/day: 0.50     Years: 5.00     Pack years: 2.50     Types: Cigarettes    Smokeless tobacco: Never Used   Substance Use Topics    Alcohol use: No     Comment: rarely    Drug use: No      Social History     Substance and Sexual Activity   Drug Use No       FAMILY HISTORY  No family history noted.    CURRENT MEDICATIONS  Current Outpatient Medications   Medication Instructions    amoxicillin (AMOXIL) 875 mg, " "Oral, 2 TIMES DAILY, Pt started on 10/14/2019 for 7 day course.    ibuprofen (MOTRIN) 600 mg, Oral, EVERY 6 HOURS PRN     ALLERGIES  Allergies   Allergen Reactions    Ciprofloxacin      Other reaction(s): Dyspnea       PHYSICAL EXAM  VITAL SIGNS: /63   Pulse 80   Temp 36.7 °C (98 °F) (Temporal)   Resp 16   Ht 1.778 m (5' 10\")   Wt 67.1 kg (148 lb)   SpO2 98%   BMI 21.24 kg/m²     Constitutional: Well developed, Well nourished, moderate distress, Non-toxic appearance. Anxious, Hyperventilating.   HENT: Normocephalic, Atraumatic, Bilateral external ears normal, Oropharynx moist, No oral exudates.   Eyes: PERRLA, EOMI, Conjunctiva normal, No discharge.   Neck: No tenderness, Supple, No stridor.   Lymphatic: No lymphadenopathy noted.   Cardiovascular: Normal heart rate, Normal rhythm.   Thorax & Lungs: Clear to auscultation bilaterally, No respiratory distress, No wheezing, No crackles.   Abdomen: Soft, No tenderness, No masses, No pulsatile masses.   Skin: Warm, Dry, No erythema, No rash.   Extremities:, No edema No cyanosis.   Musculoskeletal: Slight tenderness to palpation in the left inferior chest wall, No major deformities noted.  Intact distal pulses  Neurologic: Awake, alert. Moves all extremities spontaneously.  Psychiatric: Anxious    LABS  Results for orders placed or performed during the hospital encounter of 04/26/22   CBC with Differential   Result Value Ref Range    WBC 7.6 4.8 - 10.8 K/uL    RBC 4.92 4.70 - 6.10 M/uL    Hemoglobin 15.0 14.0 - 18.0 g/dL    Hematocrit 43.7 42.0 - 52.0 %    MCV 88.8 81.4 - 97.8 fL    MCH 30.5 27.0 - 33.0 pg    MCHC 34.3 33.7 - 35.3 g/dL    RDW 39.2 35.9 - 50.0 fL    Platelet Count 247 164 - 446 K/uL    MPV 9.8 9.0 - 12.9 fL    Neutrophils-Polys 61.00 44.00 - 72.00 %    Lymphocytes 28.50 22.00 - 41.00 %    Monocytes 8.00 0.00 - 13.40 %    Eosinophils 0.70 0.00 - 6.90 %    Basophils 0.90 0.00 - 1.80 %    Immature Granulocytes 0.90 0.00 - 0.90 %    Nucleated RBC " 0.00 /100 WBC    Neutrophils (Absolute) 4.62 1.82 - 7.42 K/uL    Lymphs (Absolute) 2.16 1.00 - 4.80 K/uL    Monos (Absolute) 0.61 0.00 - 0.85 K/uL    Eos (Absolute) 0.05 0.00 - 0.51 K/uL    Baso (Absolute) 0.07 0.00 - 0.12 K/uL    Immature Granulocytes (abs) 0.07 0.00 - 0.11 K/uL    NRBC (Absolute) 0.00 K/uL   Complete Metabolic Panel (CMP)   Result Value Ref Range    Sodium 140 135 - 145 mmol/L    Potassium 3.8 3.6 - 5.5 mmol/L    Chloride 103 96 - 112 mmol/L    Co2 25 20 - 33 mmol/L    Anion Gap 12.0 7.0 - 16.0    Glucose 100 (H) 65 - 99 mg/dL    Bun 19 8 - 22 mg/dL    Creatinine 0.83 0.50 - 1.40 mg/dL    Calcium 9.6 8.5 - 10.5 mg/dL    AST(SGOT) 21 12 - 45 U/L    ALT(SGPT) 24 2 - 50 U/L    Alkaline Phosphatase 67 30 - 99 U/L    Total Bilirubin 0.2 0.1 - 1.5 mg/dL    Albumin 4.9 3.2 - 4.9 g/dL    Total Protein 7.6 6.0 - 8.2 g/dL    Globulin 2.7 1.9 - 3.5 g/dL    A-G Ratio 1.8 g/dL   Troponin   Result Value Ref Range    Troponin T <6 6 - 19 ng/L   URINE DRUG SCREEN   Result Value Ref Range    Amphetamines Urine Negative Negative    Barbiturates Positive (A) Negative    Benzodiazepines Negative Negative    Cocaine Metabolite Negative Negative    Methadone Negative Negative    Opiates Negative Negative    Oxycodone Negative Negative    Phencyclidine -Pcp Negative Negative    Propoxyphene Negative Negative    Cannabinoid Metab Negative Negative   DIAGNOSTIC ALCOHOL   Result Value Ref Range    Diagnostic Alcohol <10.1 0.0 - 10.0 mg/dL   ESTIMATED GFR   Result Value Ref Range    GFR (CKD-EPI) 118 >60 mL/min/1.73 m 2   EKG   Result Value Ref Range    Report       [facility] Emergency Dept.    Test Date:  2022  Pt Name:    JUAN CARLOS KHALIL               Department: ER  MRN:        [id number]                      Room:        31  Gender:     Male                         Technician: [id number]  :        1990                   Requested By:ER TRIAGE PROTOCOL  Order #:    [id number]                    " Reading MD: ANT DUMONT MD    Measurements  Intervals                                Axis  Rate:       79                           P:          70  UT:         164                          QRS:        97  QRSD:       102                          T:          62  QT:         380  QTc:        436    Interpretive Statements  SINUS RHYTHM  LEFT POSTERIOR FASCICULAR BLOCK  Compared to ECG 04/17/2019 22:38:48  No significant changes  Electronically Signed On 4- 18:22:25 PDT by ANT DUMONT MD          RADIOLOGY  DX-CHEST-PORTABLE (1 VIEW)   Final Result      No acute cardiopulmonary disease evident.        The radiologist's interpretation of all radiological studies have been reviewed by me.      COURSE & MEDICAL DECISION MAKING  Pertinent Labs & Imaging studies reviewed. (See chart for details)    I reviewed the patient's medical records which showed he was seen here for 4/18/22 withdrawaling from alcohol.     3:35 PM - Patient seen and examined at bedside. Patient will be treated with Ativan 1 mg and Toradol 30 mg. Ordered Dx-chest, Urine Drug Screen, Diagnostic Alcohol, CBC with differential, CMP, Troponin, and EKG to evaluate his symptoms. The differential diagnoses include but are not limited to: panic attack vs chest pain.     4:38 PM - Patient was seen at bedside. The patient states his psychiatrist is in [place].     5:50 PM - Patient was reevaluated at bedside. He currently feels improved. Discussed plans and precautions for discharge. Patient understands and verbalizes agreement to the plan of discharge.    6:01 PM - Patient will be treated with Tylenol 1,000 mg PO.    Decision Making:  Patient just getting out of rehab for alcohol and opiates, the patient states that he is run out of his Klonopin, has been on Klonopin for years, he feels like he is having events to where he is \"seizing out\".  I believe these are likely panic attacks.  Patient is also having left-sided chest pain, " believe it is musculoskeletal in nature, EKG is unremarkable, troponins negative, the patient has a heart score of 0.  I will give the patient has a few Ativan to help the patient bridge and wean off the benzodiazepines.  He will follow-up with his psychiatrist, will have the patient return with worsening symptoms.    I reviewed prescription monitoring program for patient's narcotic use before prescribing a scheduled drug.The patient will not drink alcohol nor drive with prescribed medications.} The patient will return for new or worsening symptoms and is stable at the time of discharge.    The patient is referred to a primary physician for blood pressure management, diabetic screening, and for all other preventative health concerns.        DISPOSITION:  Patient will be discharged home in stable condition.    FOLLOW UP:  [facility], Emergency Dept  [address] [facility] [facility] [address] [phone]    If symptoms worsen    OUTPATIENT MEDICATIONS:  Discharge Medication List as of 4/26/2022  6:28 PM        FINAL IMPRESSION  1. Chest pain, unspecified type    2. Panic attack         IFrancesca (Quique), am scribing for, and in the presence of, Chinmay Zamarripa M.D..    Electronically signed by: Francesca Escobedo (Lanceibtevin), 4/26/2022    IChinmay M.D. personally performed the services described in this documentation, as scribed by Francesca Escobedo in my presence, and it is both accurate and complete. C.    The note accurately reflects work and decisions made by me.  Chinmay Zamarripa M.D.  4/26/2022  7:41 PM

## 2022-04-26 NOTE — ED TRIAGE NOTES
"Medardo Vergara 32 y.o. male to triage via w/c for     Chief Complaint   Patient presents with   • Seizure     \"I keep seizing up\".  Pt reports he's been on klonopin for 1 year and states he doesn't have enough through the end of the month so he's been cutting back his dose.  Pt reports he is from [facility].     Pt reports seen here recently after stopping all drugs and alcohol.  Pt states he has been drug and alcohol free since prior to 4/18 ER visit.  States he wants to be off the klonopin but cannot see his doctor until next month.  NAD, VSS.  Pt calm/cooperative, VSS, NAD.  /69   Pulse 80   Temp 36.7 °C (98 °F) (Temporal)   Resp 16   Ht 1.778 m (5' 10\")   Wt 67.1 kg (148 lb)   SpO2 96%   BMI 21.24 kg/m²     "

## 2022-04-26 NOTE — ED NOTES
Patient wheeled backed to [facility] 31. Patient now complains of chest pain. Chest pain protocol ordered.

## 2022-04-27 NOTE — ED NOTES
Patient discharged per order. Oral and written discharge instructions reviewed. IV removed. Medications sent to home pharmacy. New medications reviewed. All belongings accounted for and taken with patient. Questions answered, and patient agrees with discharge plan. Patient escorted to lobby.

## 2022-04-28 ENCOUNTER — APPOINTMENT (OUTPATIENT)
Dept: URGENT CARE | Facility: CLINIC | Age: 32
End: 2022-04-28
Payer: COMMERCIAL